# Patient Record
Sex: MALE | Race: WHITE | NOT HISPANIC OR LATINO | Employment: OTHER | URBAN - METROPOLITAN AREA
[De-identification: names, ages, dates, MRNs, and addresses within clinical notes are randomized per-mention and may not be internally consistent; named-entity substitution may affect disease eponyms.]

---

## 2017-05-16 NOTE — PATIENT DISCUSSION
Emphasized importance of antioxidant vitamins (AREDS II), use of AMSLER grid and UV protecting sunglasses.

## 2017-06-15 NOTE — PROCEDURE NOTE: CLINICAL
PROCEDURE NOTE: Eylea (2 of 2) OS. Diagnosis: Neovascular AMD with Active CNV. Prep: Betadine Flush. Prior to injection, risks/benefits/alternatives discussed including corneal abrasion, infection, loss of vision, hemorrhage, cataract, glaucoma, retinal tears or detachment. A written consent is on file, and the need for today’s injection was discussed and the patient is understanding and wishes to proceed. The entire vial of Eylea was drawn up into a syringe. The opened vial, remaining drug, and filtered needle were disposed of in a certified biohazard container. Betadine prep was performed. Topical anesthesia was induced with Alcaine. 4% lidocaine pledge. A lid speculum was used. A short 30g needle on a 1cc syringe was used with product that that had previously been prepared under sterile conditions. Injection site: 3-4 mm from the limbus. The used syringe/needle was transferred to a biohazard container. Lid speculum removed. Mask worn during procedure. Patient tolerated procedure well. Count fingers vision was verified. There were no complications. Patient was given the standard instruction sheet. Patient given office phone number/answering service number and advised to call immediately should there be loss of vision or pain, or should they have any other questions or concerns. Kary Chauhan

## 2017-07-25 NOTE — PATIENT DISCUSSION
Goal of tx edu, TX NOT a cure and likely will need periodic tx for years.  Rec Eylea injection x 2-3 weeks as a prophy.

## 2017-08-17 NOTE — PROCEDURE NOTE: CLINICAL
PROCEDURE NOTE: Eylea (Prophy) #3 OS. Diagnosis: Neovascular AMD with Inactive CNV. Prior to injection, risks/benefits/alternatives discussed including corneal abrasion, infection, loss of vision, hemorrhage, cataract, glaucoma, retinal tears or detachment. A written consent is on file, and the need for today’s injection was discussed and the patient is understanding and wishes to proceed. The entire vial of Eylea was drawn up into a syringe. The opened vial, remaining drug, and filtered needle were disposed of in a certified biohazard container. Betadine prep was performed. Topical anesthesia was induced with Alcaine. 4% lidocaine pledge. A lid speculum was used. A short 30g needle on a 1cc syringe was used with product that that had previously been prepared under sterile conditions. Injection site: 3-4 mm from the limbus. The used syringe/needle was transferred to a biohazard container. Lid speculum removed. Mask worn during procedure. Patient tolerated procedure well. Count fingers vision was verified. There were no complications. Patient was given the standard instruction sheet. Patient given office phone number/answering service number and advised to call immediately should there be loss of vision or pain, or should they have any other questions or concerns. Jaycee Apodaca

## 2017-10-12 NOTE — PATIENT DISCUSSION
Eylea #4 injection was administered without complication. Post-injection instructions were reviewed and understood by the patient.

## 2017-10-12 NOTE — PROCEDURE NOTE: CLINICAL
PROCEDURE NOTE: Eylea #4 OS. Diagnosis: Neovascular AMD with Active CNV. Prep: Betadine Drops and Betadine Scrub. Prior to injection, risks/benefits/alternatives discussed including corneal abrasion, infection, loss of vision, hemorrhage, cataract, glaucoma, retinal tears or detachment. A written consent is on file, and the need for today’s injection was discussed and the patient is understanding and wishes to proceed. The entire vial of Eylea was drawn up into a syringe. The opened vial, remaining drug, and filtered needle were disposed of in a certified biohazard container. Betadine prep was performed. Topical anesthesia was induced with Alcaine. 4% lidocaine pledge. A lid speculum was used. A short 30g needle on a 1cc syringe was used with product that that had previously been prepared under sterile conditions. Injection site: 3-4 mm from the limbus. The used syringe/needle was transferred to a biohazard container. Lid speculum removed. Mask worn during procedure. Patient tolerated procedure well. Count fingers vision was verified. There were no complications. Patient was given the standard instruction sheet. Patient given office phone number/answering service number and advised to call immediately should there be loss of vision or pain, or should they have any other questions or concerns. Montrell Hart

## 2017-12-07 NOTE — PROCEDURE NOTE: CLINICAL
PROCEDURE NOTE: Eylea #5 OS. Diagnosis: Neovascular AMD with Active CNV. Prep: Betadine Drops and Betadine Scrub. Prior to injection, risks/benefits/alternatives discussed including corneal abrasion, infection, loss of vision, hemorrhage, cataract, glaucoma, retinal tears or detachment. A written consent is on file, and the need for today’s injection was discussed and the patient is understanding and wishes to proceed. The entire vial of Eylea was drawn up into a syringe. The opened vial, remaining drug, and filtered needle were disposed of in a certified biohazard container. Betadine prep was performed. Topical anesthesia was induced with Alcaine. 4% lidocaine pledge. A lid speculum was used. A short 30g needle on a 1cc syringe was used with product that that had previously been prepared under sterile conditions. Injection site: 3-4 mm from the limbus. The used syringe/needle was transferred to a biohazard container. Lid speculum removed. Mask worn during procedure. Patient tolerated procedure well. Count fingers vision was verified. There were no complications. Patient was given the standard instruction sheet. Patient given office phone number/answering service number and advised to call immediately should there be loss of vision or pain, or should they have any other questions or concerns. Yosi Phillips

## 2018-01-25 NOTE — PROCEDURE NOTE: CLINICAL
PROCEDURE NOTE: Eylea #6 OS. Diagnosis: Neovascular AMD with Active CNV. Prep: Betadine Drops and Betadine Scrub. Prior to injection, risks/benefits/alternatives discussed including corneal abrasion, infection, loss of vision, hemorrhage, cataract, glaucoma, retinal tears or detachment. A written consent is on file, and the need for today’s injection was discussed and the patient is understanding and wishes to proceed. The entire vial of Eylea was drawn up into a syringe. The opened vial, remaining drug, and filtered needle were disposed of in a certified biohazard container. Betadine prep was performed. Topical anesthesia was induced with Alcaine. 4% lidocaine pledge. A lid speculum was used. A short 30g needle on a 1cc syringe was used with product that that had previously been prepared under sterile conditions. Injection site: 3-4 mm from the limbus. The used syringe/needle was transferred to a biohazard container. Lid speculum removed. Mask worn during procedure. Patient tolerated procedure well. Count fingers vision was verified. There were no complications. Patient was given the standard instruction sheet. Patient given office phone number/answering service number and advised to call immediately should there be loss of vision or pain, or should they have any other questions or concerns. Mariah Silva

## 2018-03-08 NOTE — PROCEDURE NOTE: CLINICAL
PROCEDURE NOTE: Eylea(2 OF 2) #7 OS. Diagnosis: Neovascular AMD with Active CNV. Prep: Betadine Drops and Betadine Scrub. Prior to injection, risks/benefits/alternatives discussed including corneal abrasion, infection, loss of vision, hemorrhage, cataract, glaucoma, retinal tears or detachment. A written consent is on file, and the need for today’s injection was discussed and the patient is understanding and wishes to proceed. The entire vial of Eylea was drawn up into a syringe. The opened vial, remaining drug, and filtered needle were disposed of in a certified biohazard container. Betadine prep was performed. Topical anesthesia was induced with Alcaine. 4% lidocaine pledge. A lid speculum was used. A short 30g needle on a 1cc syringe was used with product that that had previously been prepared under sterile conditions. Injection site: 3-4 mm from the limbus. The used syringe/needle was transferred to a biohazard container. Lid speculum removed. Mask worn during procedure. Patient tolerated procedure well. Count fingers vision was verified. There were no complications. Patient was given the standard instruction sheet. Patient given office phone number/answering service number and advised to call immediately should there be loss of vision or pain, or should they have any other questions or concerns. Tawnya Begum

## 2018-05-24 NOTE — PROCEDURE NOTE: CLINICAL
PROCEDURE NOTE: Eylea #8 OS. Diagnosis: Neovascular AMD with Active CNV. Prep: Betadine Drops and Betadine Scrub. Prior to injection, risks/benefits/alternatives discussed including corneal abrasion, infection, loss of vision, hemorrhage, cataract, glaucoma, retinal tears or detachment. A written consent is on file, and the need for today’s injection was discussed and the patient is understanding and wishes to proceed. The entire vial of Eylea was drawn up into a syringe. The opened vial, remaining drug, and filtered needle were disposed of in a certified biohazard container. Betadine prep was performed. Topical anesthesia was induced with Alcaine. 4% lidocaine pledge. A lid speculum was used. A short 30g needle on a 1cc syringe was used with product that that had previously been prepared under sterile conditions. Injection site: 3-4 mm from the limbus. The used syringe/needle was transferred to a biohazard container. Lid speculum removed. Mask worn during procedure. Patient tolerated procedure well. Count fingers vision was verified. There were no complications. Patient was given the standard instruction sheet. Patient given office phone number/answering service number and advised to call immediately should there be loss of vision or pain, or should they have any other questions or concerns. Bala Llanes

## 2018-07-26 NOTE — PROCEDURE NOTE: CLINICAL
PROCEDURE NOTE: Eylea #9 OS. Diagnosis: Neovascular AMD with Active CNV. Prep: Betadine Drops and Betadine Scrub. Prior to injection, risks/benefits/alternatives discussed including corneal abrasion, infection, loss of vision, hemorrhage, cataract, glaucoma, retinal tears or detachment. A written consent is on file, and the need for today’s injection was discussed and the patient is understanding and wishes to proceed. The entire vial of Eylea was drawn up into a syringe. The opened vial, remaining drug, and filtered needle were disposed of in a certified biohazard container. Betadine prep was performed. Topical anesthesia was induced with Alcaine. 4% lidocaine pledge. A lid speculum was used. A short 30g needle on a 1cc syringe was used with product that that had previously been prepared under sterile conditions. Injection site: 3-4 mm from the limbus. The used syringe/needle was transferred to a biohazard container. Lid speculum removed. Mask worn during procedure. Patient tolerated procedure well. Count fingers vision was verified. There were no complications. Patient was given the standard instruction sheet. Patient given office phone number/answering service number and advised to call immediately should there be loss of vision or pain, or should they have any other questions or concerns. Mariah Silva

## 2018-09-18 NOTE — PATIENT DISCUSSION
Eylea # 10 ( 1 of 2) injection recommended today after discussion of benefits, risks, alternatives. The injection was administered without complication. Post-injection instructions were reviewed and understood by the patient.

## 2018-09-18 NOTE — PATIENT DISCUSSION
Pt edu still inactive. No treatment recommended at this time. S/S of worsening discussed. Call with any vision changes.

## 2018-09-18 NOTE — PROCEDURE NOTE: CLINICAL
PROCEDURE NOTE: Eylea (1 of 2) #10 OS. Diagnosis: Neovascular AMD with Active CNV. Prior to injection, risks/benefits/alternatives discussed including corneal abrasion, infection, loss of vision, hemorrhage, cataract, glaucoma, retinal tears or detachment. A written consent is on file, and the need for today’s injection was discussed and the patient is understanding and wishes to proceed. The entire vial of Eylea was drawn up into a syringe. The opened vial, remaining drug, and filtered needle were disposed of in a certified biohazard container. Betadine prep was performed. Topical anesthesia was induced with Alcaine. 4% lidocaine pledge. A lid speculum was used. A short 30g needle on a 1cc syringe was used with product that that had previously been prepared under sterile conditions. Injection site: 3-4 mm from the limbus. The used syringe/needle was transferred to a biohazard container. Lid speculum removed. Mask worn during procedure. Patient tolerated procedure well. Count fingers vision was verified. There were no complications. Patient was given the standard instruction sheet. Patient given office phone number/answering service number and advised to call immediately should there be loss of vision or pain, or should they have any other questions or concerns. Montrell Hart

## 2018-09-18 NOTE — PATIENT DISCUSSION
Pt edu swelling worse and vision worse. Recommend a series of 2 Eylea, 5 weeks apart. Pt elects to proceed.

## 2018-10-25 NOTE — PROCEDURE NOTE: CLINICAL
PROCEDURE NOTE: Eylea 2mg ( 2 of 2) #11 OS. Diagnosis: Neovascular AMD with Active CNV. Prior to injection, risks/benefits/alternatives discussed including corneal abrasion, infection, loss of vision, hemorrhage, cataract, glaucoma, retinal tears or detachment. A written consent is on file, and the need for today’s injection was discussed and the patient is understanding and wishes to proceed. The entire vial of Eylea was drawn up into a syringe. The opened vial, remaining drug, and filtered needle were disposed of in a certified biohazard container. Betadine prep was performed. Topical anesthesia was induced with Alcaine. 4% lidocaine pledge. A lid speculum was used. A short 30g needle on a 1cc syringe was used with product that that had previously been prepared under sterile conditions. Injection site: 3-4 mm from the limbus. The used syringe/needle was transferred to a biohazard container. Lid speculum removed. Mask worn during procedure. Patient tolerated procedure well. Count fingers vision was verified. There were no complications. Patient was given the standard instruction sheet. Patient given office phone number/answering service number and advised to call immediately should there be loss of vision or pain, or should they have any other questions or concerns. Evon Ho

## 2018-10-25 NOTE — PATIENT DISCUSSION
Eylea # 11 ( 2 of 2) injection recommended today after discussion of benefits, risks, alternatives. The injection was administered without complication. Post-injection instructions were reviewed and understood by the patient.

## 2018-12-04 NOTE — PATIENT DISCUSSION
NO edema seen on OCT or exam today.  Recommended Eylea #12 today.  Series of 2 x 6 weeks apart.  The injection was administered without complication. Post-injection instructions were reviewed and understood by the patient.

## 2018-12-04 NOTE — PROCEDURE NOTE: CLINICAL
PROCEDURE NOTE: Eylea 2mg (1 of 2) #12 OS. Diagnosis: Neovascular AMD with Active CNV. Prior to injection, risks/benefits/alternatives discussed including corneal abrasion, infection, loss of vision, hemorrhage, cataract, glaucoma, retinal tears or detachment. A written consent is on file, and the need for today’s injection was discussed and the patient is understanding and wishes to proceed. The entire vial of Eylea was drawn up into a syringe. The opened vial, remaining drug, and filtered needle were disposed of in a certified biohazard container. Betadine prep was performed. Topical anesthesia was induced with Alcaine. 4% lidocaine pledge. A lid speculum was used. A short 30g needle on a 1cc syringe was used with product that that had previously been prepared under sterile conditions. Injection site: 3-4 mm from the limbus. The used syringe/needle was transferred to a biohazard container. Lid speculum removed. Mask worn during procedure. Patient tolerated procedure well. Count fingers vision was verified. There were no complications. Patient was given the standard instruction sheet. Patient given office phone number/answering service number and advised to call immediately should there be loss of vision or pain, or should they have any other questions or concerns. Kalyani Lauren

## 2019-02-25 ENCOUNTER — NEW PATIENT EMERGENCY (OUTPATIENT)
Dept: URBAN - METROPOLITAN AREA CLINIC 36 | Facility: CLINIC | Age: 64
End: 2019-02-25

## 2019-02-25 DIAGNOSIS — H57.89: ICD-10-CM

## 2019-02-25 PROCEDURE — 99202 OFFICE O/P NEW SF 15 MIN: CPT

## 2019-02-25 ASSESSMENT — VISUAL ACUITY
OS_SC: 20/30
OD_SC: 20/40

## 2019-02-25 ASSESSMENT — TONOMETRY
OS_IOP_MMHG: 14
OD_IOP_MMHG: 14

## 2019-03-05 NOTE — PATIENT DISCUSSION
Pt edu still inactive. No treatment recommended at this time due to no increase in [place] potential due to damage from advanced AMD. S/S of worsening discussed. Call with any vision changes.

## 2019-03-05 NOTE — PATIENT DISCUSSION
Pt edu stable. No fluid seen on exam or OCT. Due to high tendency to return would recommend a series of 2 Eylea, 8 weeks apart due to only good seeing eye. Pt elects to proceed.

## 2019-03-21 NOTE — PATIENT DISCUSSION
Eylea # 14 (1 of 2) injection recommended today after discussion of benefits, risks, alternatives, and off-label use. The injection was administered without complication. Post-injection instructions were reviewed and understood by the patient.

## 2019-03-21 NOTE — PROCEDURE NOTE: CLINICAL
PROCEDURE NOTE: Eylea 2mg ( 1 of 2) #14 OS. Diagnosis: Neovascular AMD with Active CNV. Prior to injection, risks/benefits/alternatives discussed including corneal abrasion, infection, loss of vision, hemorrhage, cataract, glaucoma, retinal tears or detachment. A written consent is on file, and the need for today’s injection was discussed and the patient is understanding and wishes to proceed. The entire vial of Eylea was drawn up into a syringe. The opened vial, remaining drug, and filtered needle were disposed of in a certified biohazard container. Betadine prep was performed. Topical anesthesia was induced with Alcaine. 4% lidocaine pledge. A lid speculum was used. A short 30g needle on a 1cc syringe was used with product that that had previously been prepared under sterile conditions. Injection site: 3-4 mm from the limbus. The used syringe/needle was transferred to a biohazard container. Lid speculum removed. Mask worn during procedure. Patient tolerated procedure well. Count fingers vision was verified. There were no complications. Patient was given the standard instruction sheet. Patient given office phone number/answering service number and advised to call immediately should there be loss of vision or pain, or should they have any other questions or concerns. Kary Chauhan

## 2019-05-09 NOTE — PATIENT DISCUSSION
Pt edu still inactive. No treatment recommended at this time due to no increase in [facility] potential due to damage from advanced AMD. S/S of worsening discussed. Call with any vision changes.

## 2019-05-09 NOTE — PATIENT DISCUSSION
Pt here for Eylea # 15 (2 of 2) injection recommended today after discussion of benefits, risks, alternatives, and off-label use. The injection was administered without complication. Post-injection instructions were reviewed and understood by the patient.

## 2019-05-09 NOTE — PROCEDURE NOTE: CLINICAL
PROCEDURE NOTE: Eylea 2mg ( 2 of 2) #15 OS. Diagnosis: Neovascular AMD with Active CNV. Prior to injection, risks/benefits/alternatives discussed including corneal abrasion, infection, loss of vision, hemorrhage, cataract, glaucoma, retinal tears or detachment. A written consent is on file, and the need for today’s injection was discussed and the patient is understanding and wishes to proceed. The entire vial of Eylea was drawn up into a syringe. The opened vial, remaining drug, and filtered needle were disposed of in a certified biohazard container. Betadine prep was performed. Topical anesthesia was induced with Alcaine. 4% lidocaine pledge. A lid speculum was used. A short 30g needle on a 1cc syringe was used with product that that had previously been prepared under sterile conditions. Injection site: 3-4 mm from the limbus. The used syringe/needle was transferred to a biohazard container. Lid speculum removed. Mask worn during procedure. Patient tolerated procedure well. Count fingers vision was verified. There were no complications. Patient was given the standard instruction sheet. Patient given office phone number/answering service number and advised to call immediately should there be loss of vision or pain, or should they have any other questions or concerns. [facility][id number] exp 11/30/2019.

## 2019-06-18 NOTE — PATIENT DISCUSSION
Pt edu improved since last series of Eylea, Due to high tendency to return and better seeing eye would recommend continuing with series of Eylea, 9 weeks apart. Goal is to treat and extend. Pt elects to proceed.

## 2019-07-11 NOTE — PROCEDURE NOTE: CLINICAL
PROCEDURE NOTE: Eylea 2mg (1 of 2) #16 OS. Diagnosis: Neovascular AMD with Active CNV. Prior to injection, risks/benefits/alternatives discussed including corneal abrasion, infection, loss of vision, hemorrhage, cataract, glaucoma, retinal tears or detachment. A written consent is on file, and the need for today’s injection was discussed and the patient is understanding and wishes to proceed. The entire vial of Eylea was drawn up into a syringe. The opened vial, remaining drug, and filtered needle were disposed of in a certified biohazard container. Betadine prep was performed. Topical anesthesia was induced with Alcaine. 4% lidocaine pledge. A lid speculum was used. A short 30g needle on a 1cc syringe was used with product that that had previously been prepared under sterile conditions. Injection site: 3-4 mm from the limbus. The used syringe/needle was transferred to a biohazard container. Lid speculum removed. Mask worn during procedure. Patient tolerated procedure well. Count fingers vision was verified. There were no complications. Patient was given the standard instruction sheet. Patient given office phone number/answering service number and advised to call immediately should there be loss of vision or pain, or should they have any other questions or concerns. Severino Haynes

## 2019-09-12 NOTE — PROCEDURE NOTE: CLINICAL
PROCEDURE NOTE: Eylea 2mg(2 of 2) #17 OS. Diagnosis: Neovascular AMD with Active CNV. Prior to injection, risks/benefits/alternatives discussed including corneal abrasion, infection, loss of vision, hemorrhage, cataract, glaucoma, retinal tears or detachment. A written consent is on file, and the need for today’s injection was discussed and the patient is understanding and wishes to proceed. The entire vial of Eylea was drawn up into a syringe. The opened vial, remaining drug, and filtered needle were disposed of in a certified biohazard container. Betadine prep was performed. Topical anesthesia was induced with Alcaine. 4% lidocaine pledge. A lid speculum was used. A short 30g needle on a 1cc syringe was used with product that that had previously been prepared under sterile conditions. Injection site: 3-4 mm from the limbus. The used syringe/needle was transferred to a biohazard container. Lid speculum removed. Mask worn during procedure. Patient tolerated procedure well. Count fingers vision was verified. There were no complications. Patient was given the standard instruction sheet. Patient given office phone number/answering service number and advised to call immediately should there be loss of vision or pain, or should they have any other questions or concerns. [facility]#[id number] exp 05/2020.

## 2019-11-05 NOTE — PATIENT DISCUSSION
Good response to tx, no active fluid or blood but high tendency for activity to return.  Goal of tx edu, tx not a cure.  Rec series of 2 Avastin x 10 weeks apart,Patient will return for procedure.  Tx Q 10 weeks as long as stays inactive.

## 2019-11-21 NOTE — PATIENT DISCUSSION
Pt here today for Eylea #18 (1 of 2) injection.  The injection was administered without complication.  Post-injection instructions were reviewed and understood by pt.

## 2019-11-21 NOTE — PROCEDURE NOTE: CLINICAL
PROCEDURE NOTE: Eylea 2mg (1 of 2) #18 OS. Diagnosis: Neovascular AMD with Active CNV. Prep: Betadine Drops and Betadine Scrub. Prior to injection, risks/benefits/alternatives discussed including corneal abrasion, infection, loss of vision, hemorrhage, cataract, glaucoma, retinal tears or detachment. A written consent is on file, and the need for today’s injection was discussed and the patient is understanding and wishes to proceed. The entire vial of Eylea was drawn up into a syringe. The opened vial, remaining drug, and filtered needle were disposed of in a certified biohazard container. Betadine prep was performed. Topical anesthesia was induced with Alcaine. 4% lidocaine pledge. A lid speculum was used. A short 30g needle on a 1cc syringe was used with product that that had previously been prepared under sterile conditions. Injection site: 3-4 mm from the limbus. The used syringe/needle was transferred to a biohazard container. Lid speculum removed. Mask worn during procedure. Patient tolerated procedure well. Count fingers vision was verified. There were no complications. Patient was given the standard instruction sheet. Patient given office phone number/answering service number and advised to call immediately should there be loss of vision or pain, or should they have any other questions or concerns. Lot #[id number], exp 9/30/20.

## 2020-01-30 NOTE — PROCEDURE NOTE: CLINICAL
PROCEDURE NOTE: Eylea 2mg (2 of 2) #19 OS. Diagnosis: Neovascular AMD with Active CNV. Prep: Betadine Drops and Betadine Scrub. Prior to injection, risks/benefits/alternatives discussed including corneal abrasion, infection, loss of vision, hemorrhage, cataract, glaucoma, retinal tears or detachment. A written consent is on file, and the need for today’s injection was discussed and the patient is understanding and wishes to proceed. The entire vial of Eylea was drawn up into a syringe. The opened vial, remaining drug, and filtered needle were disposed of in a certified biohazard container. Betadine prep was performed. Topical anesthesia was induced with Alcaine. 4% lidocaine pledge. A lid speculum was used. A short 30g needle on a 1cc syringe was used with product that that had previously been prepared under sterile conditions. Injection site: 3-4 mm from the limbus. The used syringe/needle was transferred to a biohazard container. Lid speculum removed. Mask worn during procedure. Patient tolerated procedure well. Count fingers vision was verified. There were no complications. Patient was given the standard instruction sheet. Patient given office phone number/answering service number and advised to call immediately should there be loss of vision or pain, or should they have any other questions or concerns. Kalpana Sanchez

## 2020-01-30 NOTE — PATIENT DISCUSSION
Pt here today for Eylea #19 (2 of 2) injection.  The injection was administered without complication.  Post-injection instructions were reviewed and understood by pt.

## 2020-03-18 ENCOUNTER — ESTABLISHED COMPREHENSIVE EXAM (OUTPATIENT)
Dept: URBAN - METROPOLITAN AREA CLINIC 36 | Facility: CLINIC | Age: 65
End: 2020-03-18

## 2020-03-18 DIAGNOSIS — H52.03: ICD-10-CM

## 2020-03-18 PROCEDURE — 92015 DETERMINE REFRACTIVE STATE: CPT

## 2020-03-18 PROCEDURE — 92014 COMPRE OPH EXAM EST PT 1/>: CPT

## 2020-03-18 ASSESSMENT — VISUAL ACUITY
OD_SC: 20/20-1
OD_CC: J2
OS_SC: 20/30-2

## 2020-03-18 ASSESSMENT — TONOMETRY
OS_IOP_MMHG: 16
OD_IOP_MMHG: 16

## 2020-03-24 NOTE — PROCEDURE NOTE: CLINICAL
PROCEDURE NOTE: Eylea Prefilled Syringe 2mg (1 of 2) #20 OS. Diagnosis: Neovascular AMD with Active CNV. Prep: Betadine Drops and Betadine Scrub. Prior to injection, risks/benefits/alternatives discussed including corneal abrasion, infection, loss of vision, hemorrhage, cataract, glaucoma, retinal tears or detachment. A written consent is on file, and the need for today's injection was discussed and the patient is understanding and wishes to proceed. A 30G needle was placed on an Eylea 2mg/0.05ml Pre-filled Syringe. Betadine prep was performed. Topical anesthesia was induced with Alcaine. 4% lidocaine pledge. A lid speculum was used. An intravitreal injection of Eylea was given. Injection site: 3-4 mm from the limbus. The used syringe/needle was transferred to a biohazard container. Lid speculum removed. Mask worn during procedure. Patient tolerated procedure well. Count fingers vision was verified. There were no complications. Patient was given the standard instruction sheet. Stephanie Witt

## 2020-03-24 NOTE — PATIENT DISCUSSION
COVID PROTOCOL. OCT remaining stable, due to better seeing eye would recommend continuing with a series of 2 Eylea, 2 months apart. Pt elects to proceed.

## 2020-03-24 NOTE — PATIENT DISCUSSION
Eylea #20 (1 of 2) injection recommended today after discussion of benefits, risks, alternatives, and off-label use. The injection was administered without complication. Post-injection instructions were reviewed and understood by the patient.

## 2020-05-04 ENCOUNTER — CATARACT EVALUATION (OUTPATIENT)
Dept: URBAN - METROPOLITAN AREA CLINIC 43 | Facility: CLINIC | Age: 65
End: 2020-05-04

## 2020-05-04 DIAGNOSIS — H25.812: ICD-10-CM

## 2020-05-04 DIAGNOSIS — H17.9: ICD-10-CM

## 2020-05-04 DIAGNOSIS — H52.212: ICD-10-CM

## 2020-05-04 DIAGNOSIS — H25.811: ICD-10-CM

## 2020-05-04 PROCEDURE — 99214 OFFICE O/P EST MOD 30 MIN: CPT

## 2020-05-04 PROCEDURE — 92136TC50 INTERFEROMETRY - TECHNICAL COMPONENT

## 2020-05-04 PROCEDURE — 92025-2 CORNEAL TOPOGRAPHY, PT

## 2020-05-04 PROCEDURE — V2799PMN IMPRIMIS PRED-MOXI-NEPAF 5ML

## 2020-05-04 PROCEDURE — 92015 DETERMINE REFRACTIVE STATE: CPT

## 2020-05-04 ASSESSMENT — VISUAL ACUITY
OU_SC: 20/20-2
OD_BAT: 20/400
OS_SC: J10
OD_CC: J2+
OS_BAT: 20/80
OD_SC: 20/20-2
OU_CC: J1-
OS_SC: 20/40-2
OU_SC: J10
OD_SC: J10

## 2020-05-04 ASSESSMENT — TONOMETRY
OD_IOP_MMHG: 14
OS_IOP_MMHG: 14

## 2020-05-21 NOTE — PATIENT DISCUSSION
Eylea #21 (2 of 2) injection recommended today after discussion of benefits, risks, alternatives. The injection was administered without complication. Post-injection instructions were reviewed and understood by the patient.

## 2020-05-21 NOTE — PROCEDURE NOTE: CLINICAL
PROCEDURE NOTE: Eylea Prefilled Syringe 2mg (2 of 2) #21 OS. Diagnosis: Neovascular AMD with Active CNV. Prep: Betadine Drops and Betadine Scrub. Prior to injection, risks/benefits/alternatives discussed including corneal abrasion, infection, loss of vision, hemorrhage, cataract, glaucoma, retinal tears or detachment. A written consent is on file, and the need for today's injection was discussed and the patient is understanding and wishes to proceed. A 30G needle was placed on an Eylea 2mg/0.05ml Pre-filled Syringe. Betadine prep was performed. Topical anesthesia was induced with Alcaine. 4% lidocaine pledge. A lid speculum was used. An intravitreal injection of Eylea was given. Injection site: 3-4 mm from the limbus. The used syringe/needle was transferred to a biohazard container. Lid speculum removed. Mask worn during procedure. Patient tolerated procedure well. Count fingers vision was verified. There were no complications. Patient was given the standard instruction sheet. Yosi Phillips

## 2020-07-16 NOTE — PATIENT DISCUSSION
Based on the OCT findings, I recommend a “treat and extend” protocol, and we will extend the injection interval to 8 weeks.

## 2020-07-16 NOTE — PATIENT DISCUSSION
Eylea #22 (1 of 2) injection recommended today after discussion of benefits, risks, alternatives. The injection was administered without complication. Post-injection instructions were reviewed and understood by the patient.

## 2020-07-16 NOTE — PROCEDURE NOTE: CLINICAL
PROCEDURE NOTE: Eylea Prefilled Syringe 2mg (1 of 2) #22 OS. Diagnosis: Neovascular AMD with Active CNV. Prior to injection, risks/benefits/alternatives discussed including corneal abrasion, infection, loss of vision, hemorrhage, cataract, glaucoma, retinal tears or detachment. A written consent is on file, and the need for today's injection was discussed and the patient is understanding and wishes to proceed. A 30G needle was placed on an Eylea 2mg/0.05ml Pre-filled Syringe. Betadine prep was performed. Topical anesthesia was induced with Alcaine. 4% lidocaine pledge. A lid speculum was used. An intravitreal injection of Eylea was given. Injection site: 3-4 mm from the limbus. The used syringe/needle was transferred to a biohazard container. Lid speculum removed. Mask worn during procedure. Patient tolerated procedure well. Count fingers vision was verified. There were no complications. Patient was given the standard instruction sheet. Reed Lombardi

## 2020-08-13 ENCOUNTER — ESTABLISHED PATIENT (OUTPATIENT)
Dept: URBAN - METROPOLITAN AREA SURGERY 14 | Facility: SURGERY | Age: 65
End: 2020-08-13

## 2020-08-13 ENCOUNTER — SURGERY/PROCEDURE (OUTPATIENT)
Dept: URBAN - METROPOLITAN AREA CLINIC 43 | Facility: CLINIC | Age: 65
End: 2020-08-13

## 2020-08-13 DIAGNOSIS — H52.03: ICD-10-CM

## 2020-08-13 DIAGNOSIS — H25.811: ICD-10-CM

## 2020-08-13 DIAGNOSIS — H25.812: ICD-10-CM

## 2020-08-13 DIAGNOSIS — H53.2: ICD-10-CM

## 2020-08-13 DIAGNOSIS — Z98.890: ICD-10-CM

## 2020-08-13 DIAGNOSIS — H17.9: ICD-10-CM

## 2020-08-13 DIAGNOSIS — H52.212: ICD-10-CM

## 2020-08-13 PROCEDURE — 66984AV REMOVE CATARACT, INSERT ADVANCED LENS

## 2020-08-13 PROCEDURE — 66999LNSR LENSAR LASER FOR CAT SX

## 2020-08-13 PROCEDURE — 99211HP H&P OFFICE/OUTPATIENT VISIT, EST

## 2020-08-14 ENCOUNTER — 1 DAY POST-OP (OUTPATIENT)
Dept: URBAN - METROPOLITAN AREA CLINIC 36 | Facility: CLINIC | Age: 65
End: 2020-08-14

## 2020-08-14 DIAGNOSIS — Z96.1: ICD-10-CM

## 2020-08-14 PROCEDURE — 99024 POSTOP FOLLOW-UP VISIT: CPT

## 2020-08-14 ASSESSMENT — VISUAL ACUITY
OD_SC: 20/25
OS_PH: 20/40-2
OS_SC: 20/50-2
OS_SC: J4
OD_SC: >J10

## 2020-08-14 ASSESSMENT — TONOMETRY
OD_IOP_MMHG: 15
OS_IOP_MMHG: 16

## 2020-08-19 ENCOUNTER — POST-OP (OUTPATIENT)
Dept: URBAN - METROPOLITAN AREA CLINIC 36 | Facility: CLINIC | Age: 65
End: 2020-08-19

## 2020-08-19 DIAGNOSIS — H52.03: ICD-10-CM

## 2020-08-19 DIAGNOSIS — Z96.1: ICD-10-CM

## 2020-08-19 PROCEDURE — 99024 POSTOP FOLLOW-UP VISIT: CPT

## 2020-08-19 ASSESSMENT — VISUAL ACUITY
OD_SC: 20/25
OS_SC: J3
OS_PH: 20/25-1
OS_SC: 20/30-1
OD_SC: >J10

## 2020-08-19 ASSESSMENT — TONOMETRY
OS_IOP_MMHG: 14
OD_IOP_MMHG: 15

## 2020-09-10 ENCOUNTER — POST-OP (OUTPATIENT)
Dept: URBAN - METROPOLITAN AREA CLINIC 36 | Facility: CLINIC | Age: 65
End: 2020-09-10

## 2020-09-10 DIAGNOSIS — Z96.1: ICD-10-CM

## 2020-09-10 DIAGNOSIS — H17.9: ICD-10-CM

## 2020-09-10 DIAGNOSIS — H25.811: ICD-10-CM

## 2020-09-10 PROCEDURE — 99024 POSTOP FOLLOW-UP VISIT: CPT

## 2020-09-10 ASSESSMENT — TONOMETRY
OS_IOP_MMHG: 15
OD_IOP_MMHG: 15

## 2020-09-10 ASSESSMENT — VISUAL ACUITY
OD_BAT: 20/70
OD_SC: J12
OS_BAT: 20/70
OS_SC: 20/20
OD_SC: 20/20-1
OS_SC: J4

## 2020-09-10 NOTE — PATIENT DISCUSSION
Eylea #23 (2 of 2) injection recommended today after discussion of benefits, risks, alternatives. The injection was administered without complication. Post-injection instructions were reviewed and understood by the patient.

## 2020-09-10 NOTE — PROCEDURE NOTE: CLINICAL
PROCEDURE NOTE: Eylea Prefilled Syringe 2mg (2 of 2) #23 OS. Diagnosis: Neovascular AMD with Active CNV. Prep: Betadine Drops and Betadine Scrub. Prior to injection, risks/benefits/alternatives discussed including corneal abrasion, infection, loss of vision, hemorrhage, cataract, glaucoma, retinal tears or detachment. A written consent is on file, and the need for today's injection was discussed and the patient is understanding and wishes to proceed. A 30G needle was placed on an Eylea 2mg/0.05ml Pre-filled Syringe. Betadine prep was performed. Topical anesthesia was induced with Alcaine. 4% lidocaine pledge. A lid speculum was used. An intravitreal injection of Eylea was given. Injection site: 3-4 mm from the limbus. The used syringe/needle was transferred to a biohazard container. Lid speculum removed. Mask worn during procedure. Patient tolerated procedure well. Count fingers vision was verified. There were no complications. Patient was given the standard instruction sheet. Ximena Bates

## 2020-09-10 NOTE — PATIENT DISCUSSION
Pt edu still inactive. No treatment recommended at this time due to no increase in [address] potential due to damage from advanced AMD. S/S of worsening discussed. Call with any vision changes.

## 2020-10-22 ENCOUNTER — POST-OP (OUTPATIENT)
Dept: URBAN - METROPOLITAN AREA CLINIC 36 | Facility: CLINIC | Age: 65
End: 2020-10-22

## 2020-10-22 DIAGNOSIS — Z96.1: ICD-10-CM

## 2020-10-22 DIAGNOSIS — H17.9: ICD-10-CM

## 2020-10-22 DIAGNOSIS — H26.492: ICD-10-CM

## 2020-10-22 PROCEDURE — 99024 POSTOP FOLLOW-UP VISIT: CPT

## 2020-10-22 ASSESSMENT — VISUAL ACUITY
OS_BAT: 20/200
OD_SC: 20/25
OD_SC: J12
OS_SC: J3
OS_SC: 20/25

## 2020-10-22 ASSESSMENT — TONOMETRY
OD_IOP_MMHG: 15
OS_IOP_MMHG: 15

## 2020-11-10 NOTE — PATIENT DISCUSSION
Eylea #24 (1 of 2) injection recommended today after discussion of benefits, risks, alternatives. The injection was administered without complication. Post-injection instructions were reviewed and understood by the patient.

## 2020-11-10 NOTE — PATIENT DISCUSSION
Decision to treat was made based on today's clinical findings, pt edu stable. No fluid seen on exam or OCT. Due to better seeing eye would recommend a series of 2 Eylea, 8 weeks apart. Pt elects to proceed.

## 2020-11-10 NOTE — PROCEDURE NOTE: CLINICAL
PROCEDURE NOTE: Eylea 2mg (1 of 2) #24 OS. Diagnosis: Neovascular AMD with Active CNV. Prep: Betadine Drops and Betadine Scrub. Prior to injection, risks/benefits/alternatives discussed including corneal abrasion, infection, loss of vision, hemorrhage, cataract, glaucoma, retinal tears or detachment. A written consent is on file, and the need for today’s injection was discussed and the patient is understanding and wishes to proceed. The entire vial of Eylea was drawn up into a syringe. The opened vial, remaining drug, and filtered needle were disposed of in a certified biohazard container. Betadine prep was performed. Topical anesthesia was induced with Alcaine. 4% lidocaine pledge. A lid speculum was used. A short 30g needle on a 1cc syringe was used with product that that had previously been prepared under sterile conditions. Injection site: 3-4 mm from the limbus. The used syringe/needle was transferred to a biohazard container. Lid speculum removed. Mask worn during procedure. Patient tolerated procedure well. Count fingers vision was verified. There were no complications. Patient was given the standard instruction sheet. Patient given office phone number/answering service number and advised to call immediately should there be loss of vision or pain, or should they have any other questions or concerns. Cm Garcia

## 2020-11-10 NOTE — PATIENT DISCUSSION
Recommend starting Zerviate 1gtt BID OU, samples given. Use until gone. Also recommend w/c at night for OU.

## 2020-11-19 ENCOUNTER — POST-OP (OUTPATIENT)
Dept: URBAN - METROPOLITAN AREA CLINIC 36 | Facility: CLINIC | Age: 65
End: 2020-11-19

## 2020-11-19 DIAGNOSIS — Z96.1: ICD-10-CM

## 2020-11-19 DIAGNOSIS — H26.492: ICD-10-CM

## 2020-11-19 PROCEDURE — 99024 POSTOP FOLLOW-UP VISIT: CPT

## 2020-11-19 PROCEDURE — 92134 CPTRZ OPH DX IMG PST SGM RTA: CPT

## 2020-11-19 ASSESSMENT — VISUAL ACUITY
OS_BAT: 20/40
OD_SC: 20/20-2
OD_SC: J8
OS_SC: 20/30-2
OS_SC: J2

## 2020-11-19 ASSESSMENT — TONOMETRY
OS_IOP_MMHG: 10
OD_IOP_MMHG: 16

## 2020-12-15 ENCOUNTER — SURGERY/PROCEDURE (OUTPATIENT)
Dept: URBAN - METROPOLITAN AREA SURGERY 14 | Facility: SURGERY | Age: 65
End: 2020-12-15

## 2020-12-15 ENCOUNTER — YAG EVALUATION (OUTPATIENT)
Dept: URBAN - METROPOLITAN AREA CLINIC 39 | Facility: CLINIC | Age: 65
End: 2020-12-15

## 2020-12-15 DIAGNOSIS — H26.492: ICD-10-CM

## 2020-12-15 DIAGNOSIS — Z96.1: ICD-10-CM

## 2020-12-15 PROCEDURE — 92014 COMPRE OPH EXAM EST PT 1/>: CPT

## 2020-12-15 PROCEDURE — 66821 AFTER CATARACT LASER SURGERY: CPT

## 2020-12-15 ASSESSMENT — VISUAL ACUITY
OD_SC: 20/40
OS_BAT: 20/40
OS_SC: 20/40

## 2020-12-15 ASSESSMENT — TONOMETRY: OS_IOP_MMHG: 16

## 2020-12-29 ENCOUNTER — YAG POST-OP (OUTPATIENT)
Dept: URBAN - METROPOLITAN AREA CLINIC 36 | Facility: CLINIC | Age: 65
End: 2020-12-29

## 2020-12-29 DIAGNOSIS — H17.9: ICD-10-CM

## 2020-12-29 DIAGNOSIS — Z96.1: ICD-10-CM

## 2020-12-29 DIAGNOSIS — H52.03: ICD-10-CM

## 2020-12-29 DIAGNOSIS — Z98.890: ICD-10-CM

## 2020-12-29 PROCEDURE — 99024 POSTOP FOLLOW-UP VISIT: CPT

## 2020-12-29 ASSESSMENT — TONOMETRY
OS_IOP_MMHG: 10
OD_IOP_MMHG: 10

## 2020-12-29 ASSESSMENT — VISUAL ACUITY
OS_PH: 20/20-1
OD_SC: J12
OS_SC: J6
OS_SC: 20/30
OD_SC: 20/20-1

## 2021-01-07 NOTE — PATIENT DISCUSSION
Eylea #25 (2 of 2) injection recommended today after discussion of benefits, risks, alternatives. The injection was administered without complication. Post-injection instructions were reviewed and understood by the patient.

## 2021-01-07 NOTE — PROCEDURE NOTE: CLINICAL
PROCEDURE NOTE: Eylea 2mg (2 of 2) #25 OS. Diagnosis: Neovascular AMD with Active CNV. Prep: Betadine Drops and Betadine Scrub. Prior to injection, risks/benefits/alternatives discussed including corneal abrasion, infection, loss of vision, hemorrhage, cataract, glaucoma, retinal tears or detachment. A written consent is on file, and the need for today’s injection was discussed and the patient is understanding and wishes to proceed. The entire vial of Eylea was drawn up into a syringe. The opened vial, remaining drug, and filtered needle were disposed of in a certified biohazard container. Betadine prep was performed. Topical anesthesia was induced with Alcaine. 4% lidocaine pledge. A lid speculum was used. A short 30g needle on a 1cc syringe was used with product that that had previously been prepared under sterile conditions. Injection site: 3-4 mm from the limbus. The used syringe/needle was transferred to a biohazard container. Lid speculum removed. Mask worn during procedure. Patient tolerated procedure well. Count fingers vision was verified. There were no complications. Patient was given the standard instruction sheet. Patient given office phone number/answering service number and advised to call immediately should there be loss of vision or pain, or should they have any other questions or concerns. Natalie Garrison

## 2021-02-16 ENCOUNTER — YAG POST-OP (OUTPATIENT)
Dept: URBAN - METROPOLITAN AREA CLINIC 36 | Facility: CLINIC | Age: 66
End: 2021-02-16

## 2021-02-16 DIAGNOSIS — Z96.1: ICD-10-CM

## 2021-02-16 DIAGNOSIS — Z98.890: ICD-10-CM

## 2021-02-16 DIAGNOSIS — H17.9: ICD-10-CM

## 2021-02-16 DIAGNOSIS — H25.811: ICD-10-CM

## 2021-02-16 PROCEDURE — 99024 POSTOP FOLLOW-UP VISIT: CPT

## 2021-02-16 ASSESSMENT — VISUAL ACUITY
OS_SC: J2
OD_SC: J12
OS_PH: 20/20
OD_SC: 20/20-1
OS_SC: 20/30

## 2021-02-16 ASSESSMENT — TONOMETRY
OS_IOP_MMHG: 10
OD_IOP_MMHG: 10

## 2021-03-11 NOTE — PATIENT DISCUSSION
Eylea #26 (1 of 2) injection recommended today after discussion of benefits, risks, alternatives. The injection was administered without complication. Post-injection instructions were reviewed and understood by the patient.

## 2021-03-11 NOTE — PATIENT DISCUSSION
Decision to treat was made based on today's clinical findings. Recommend a series of 2 Eylea, 2 months apart. Pt elects to proceed.

## 2021-03-11 NOTE — PROCEDURE NOTE: CLINICAL
1. Caller Name: Nat Bryant                        Call Back Number: [phone] (home)         How would the patient prefer to be contacted with a response: Phone call do NOT leave a detailed message    Nat JESSICA advising Pt was sent home from  with 101.3 fever. Nat advisied pt is teething but also has a flemy cough that seems to be painful and has had pneumonia in the past without fevers and wants to know if she needs to be seen. -Per Starr Head Pt should go to [facility] as there are no more sick appt available today. Nat expressed understanding and states she would take the Pt into [facility] to be seen today.   PROCEDURE NOTE: Eylea 2mg (1 of 2) #26 OS. Diagnosis: Neovascular AMD with Active CNV. Prep: Betadine Drops and Betadine Scrub. Prior to injection, risks/benefits/alternatives discussed including corneal abrasion, infection, loss of vision, hemorrhage, cataract, glaucoma, retinal tears or detachment. A written consent is on file, and the need for today’s injection was discussed and the patient is understanding and wishes to proceed. The entire vial of Eylea was drawn up into a syringe. The opened vial, remaining drug, and filtered needle were disposed of in a certified biohazard container. Betadine prep was performed. Topical anesthesia was induced with Alcaine. 4% lidocaine pledge. A lid speculum was used. A short 30g needle on a 1cc syringe was used with product that that had previously been prepared under sterile conditions. Injection site: 3-4 mm from the limbus. The used syringe/needle was transferred to a biohazard container. Lid speculum removed. Mask worn during procedure. Patient tolerated procedure well. Count fingers vision was verified. There were no complications. Patient was given the standard instruction sheet. Patient given office phone number/answering service number and advised to call immediately should there be loss of vision or pain, or should they have any other questions or concerns. Yana Avila

## 2021-05-13 NOTE — PATIENT DISCUSSION
Eylea #27 (2 of 2) injection recommended today after discussion of benefits, risks, alternatives. The injection was administered without complication. Post-injection instructions were reviewed and understood by the patient.

## 2021-05-13 NOTE — PROCEDURE NOTE: CLINICAL
PROCEDURE NOTE: Eylea 2mg(2 of 2) #27 OS. Diagnosis: Neovascular AMD with Active CNV. Prep: Betadine Drops and Betadine Scrub. Prior to injection, risks/benefits/alternatives discussed including corneal abrasion, infection, loss of vision, hemorrhage, cataract, glaucoma, retinal tears or detachment. A written consent is on file, and the need for today’s injection was discussed and the patient is understanding and wishes to proceed. The entire vial of Eylea was drawn up into a syringe. The opened vial, remaining drug, and filtered needle were disposed of in a certified biohazard container. Betadine prep was performed. Topical anesthesia was induced with Alcaine. 4% lidocaine pledge. A lid speculum was used. A short 30g needle on a 1cc syringe was used with product that that had previously been prepared under sterile conditions. Injection site: 3-4 mm from the limbus. The used syringe/needle was transferred to a biohazard container. Lid speculum removed. Mask worn during procedure. Patient tolerated procedure well. Count fingers vision was verified. There were no complications. Patient was given the standard instruction sheet. Patient given office phone number/answering service number and advised to call immediately should there be loss of vision or pain, or should they have any other questions or concerns. Yana Avila

## 2021-07-08 NOTE — PROCEDURE NOTE: CLINICAL
PROCEDURE NOTE: Eylea 2mg(1 of 2) #28 OS. Diagnosis: Neovascular AMD with Active CNV. Prep: Betadine Drops and Betadine Scrub. Prior to injection, risks/benefits/alternatives discussed including corneal abrasion, infection, loss of vision, hemorrhage, cataract, glaucoma, retinal tears or detachment. A written consent is on file, and the need for today’s injection was discussed and the patient is understanding and wishes to proceed. The entire vial of Eylea was drawn up into a syringe. The opened vial, remaining drug, and filtered needle were disposed of in a certified biohazard container. Betadine prep was performed. Topical anesthesia was induced with Alcaine. 4% lidocaine pledge. A lid speculum was used. A short 30g needle on a 1cc syringe was used with product that that had previously been prepared under sterile conditions. Injection site: 3-4 mm from the limbus. The used syringe/needle was transferred to a biohazard container. Lid speculum removed. Mask worn during procedure. Patient tolerated procedure well. Count fingers vision was verified. There were no complications. Patient was given the standard instruction sheet. Patient given office phone number/answering service number and advised to call immediately should there be loss of vision or pain, or should they have any other questions or concerns. Neil Barry

## 2021-07-08 NOTE — PATIENT DISCUSSION
Good response to tx, no active fluid or blood today.  Eylea #28 (1 of 2) injection recommended today after discussion of benefits, risks, alternatives. The injection was administered without complication. Post-injection instructions were reviewed and understood by the patient.  seriers of 2 x 8 weeks apart. Iesha Anguiano

## 2021-09-09 NOTE — PROCEDURE NOTE: CLINICAL
PROCEDURE NOTE: Eylea 2mg (2 of 2) #29 OS. Diagnosis: Neovascular AMD with Active CNV. Prep: Betadine Drops and Betadine Scrub. Prior to injection, risks/benefits/alternatives discussed including corneal abrasion, infection, loss of vision, hemorrhage, cataract, glaucoma, retinal tears or detachment. A written consent is on file, and the need for today’s injection was discussed and the patient is understanding and wishes to proceed. The entire vial of Eylea was drawn up into a syringe. The opened vial, remaining drug, and filtered needle were disposed of in a certified biohazard container. Betadine prep was performed. Topical anesthesia was induced with Alcaine. 4% lidocaine pledge. A lid speculum was used. A short 30g needle on a 1cc syringe was used with product that that had previously been prepared under sterile conditions. Injection site: 3-4 mm from the limbus. The used syringe/needle was transferred to a biohazard container. Lid speculum removed. Mask worn during procedure. Patient tolerated procedure well. Count fingers vision was verified. There were no complications. Patient was given the standard instruction sheet. Patient given office phone number/answering service number and advised to call immediately should there be loss of vision or pain, or should they have any other questions or concerns. Kary Chauhan

## 2021-09-23 ENCOUNTER — RX CHANGE - NO APPT (OUTPATIENT)
Dept: URBAN - METROPOLITAN AREA CLINIC 36 | Facility: CLINIC | Age: 66
End: 2021-09-23

## 2021-10-19 NOTE — PROCEDURE NOTE: CLINICAL
PROCEDURE NOTE: Eylea Prefilled Syringe 2mg(1 of 2) #30 OS. Diagnosis: Neovascular AMD with Active CNV. Prep: Betadine Drops and Betadine Scrub. Prior to injection, risks/benefits/alternatives discussed including corneal abrasion, infection, loss of vision, hemorrhage, cataract, glaucoma, retinal tears or detachment. A written consent is on file, and the need for today's injection was discussed and the patient is understanding and wishes to proceed. A 30G needle was placed on an Eylea 2mg/0.05ml Pre-filled Syringe. Betadine prep was performed. Topical anesthesia was induced with Alcaine. 4% lidocaine pledge. A lid speculum was used. An intravitreal injection of Eylea was given. Injection site: 3-4 mm from the limbus. The used syringe/needle was transferred to a biohazard container. Lid speculum removed. Mask worn during procedure. Patient tolerated procedure well. Count fingers vision was verified. There were no complications. Patient was given the standard instruction sheet. Chaparro Stovall

## 2021-10-19 NOTE — PATIENT DISCUSSION
rec Eylea #30 (1 of 2) injection recommended today after discussion of benefits, risks, alternatives. The injection was administered without complication. Post-injection instructions were reviewed and understood by the patient.  series of 2 x 6 weeks apart.

## 2021-12-09 NOTE — PATIENT DISCUSSION
Rec Eylea #31 (2 of 2) injection recommended today after discussion of benefits, risks, alternatives. The injection was administered without complication. Post-injection instructions were reviewed and understood by the patient.  series of 2 x 6 weeks apart.

## 2021-12-09 NOTE — PROCEDURE NOTE: CLINICAL
PROCEDURE NOTE: Eylea 2mg(2 of 2) #31 OS. Diagnosis: Neovascular AMD with Active CNV. Prep: Betadine Drops and Betadine Scrub. Prior to injection, risks/benefits/alternatives discussed including corneal abrasion, infection, loss of vision, hemorrhage, cataract, glaucoma, retinal tears or detachment. A written consent is on file, and the need for today’s injection was discussed and the patient is understanding and wishes to proceed. The entire vial of Eylea was drawn up into a syringe. The opened vial, remaining drug, and filtered needle were disposed of in a certified biohazard container. Betadine prep was performed. Topical anesthesia was induced with Alcaine. 4% lidocaine pledge. A lid speculum was used. A short 30g needle on a 1cc syringe was used with product that that had previously been prepared under sterile conditions. Injection site: 3-4 mm from the limbus. The used syringe/needle was transferred to a biohazard container. Lid speculum removed. Mask worn during procedure. Patient tolerated procedure well. Count fingers vision was verified. There were no complications. Patient was given the standard instruction sheet. Patient given office phone number/answering service number and advised to call immediately should there be loss of vision or pain, or should they have any other questions or concerns. Enmanuel Stahl

## 2022-01-25 NOTE — PATIENT DISCUSSION
Rec Eylea #32 (21 of 2) injection recommended today after discussion of benefits, risks, alternatives. The injection was administered without complication. Post-injection instructions were reviewed and understood by the patient.  series of 2 x 6-7  weeks apart.

## 2022-01-25 NOTE — PROCEDURE NOTE: CLINICAL
PROCEDURE NOTE: Eylea 2mg(1 of 2) #32 OS. Diagnosis: Neovascular AMD with Active CNV. Prep: Betadine Drops and Betadine Scrub. Prior to injection, risks/benefits/alternatives discussed including corneal abrasion, infection, loss of vision, hemorrhage, cataract, glaucoma, retinal tears or detachment. A written consent is on file, and the need for today’s injection was discussed and the patient is understanding and wishes to proceed. The entire vial of Eylea was drawn up into a syringe. The opened vial, remaining drug, and filtered needle were disposed of in a certified biohazard container. Betadine prep was performed. Topical anesthesia was induced with Alcaine. 4% lidocaine pledge. A lid speculum was used. A short 30g needle on a 1cc syringe was used with product that that had previously been prepared under sterile conditions. Injection site: 3-4 mm from the limbus. The used syringe/needle was transferred to a biohazard container. Lid speculum removed. Mask worn during procedure. Patient tolerated procedure well. Count fingers vision was verified. There were no complications. Patient was given the standard instruction sheet. Patient given office phone number/answering service number and advised to call immediately should there be loss of vision or pain, or should they have any other questions or concerns. Evon Ho

## 2022-02-04 NOTE — PATIENT DISCUSSION
Patient is aware that his actual rx (the manifest) is slightly higher than he uses but he likes his glasses as they are and as such we continue to maintain his final rx as his habitual rx and not the manifest rx.

## 2022-02-28 ENCOUNTER — EMERGENCY VISIT (OUTPATIENT)
Dept: URBAN - METROPOLITAN AREA CLINIC 36 | Facility: CLINIC | Age: 67
End: 2022-02-28

## 2022-02-28 DIAGNOSIS — H43.392: ICD-10-CM

## 2022-02-28 PROCEDURE — 92012 INTRM OPH EXAM EST PATIENT: CPT

## 2022-02-28 PROCEDURE — 92134 CPTRZ OPH DX IMG PST SGM RTA: CPT

## 2022-02-28 ASSESSMENT — VISUAL ACUITY
OS_PH: 20/25-1
OD_SC: 20/20-2
OS_SC: 20/30
OS_SC: J4
OD_SC: J12

## 2022-02-28 ASSESSMENT — TONOMETRY
OD_IOP_MMHG: 10
OS_IOP_MMHG: 10

## 2022-03-17 NOTE — PATIENT DISCUSSION
Rec Eylea #33 (2 of 2) injection recommended today after discussion of benefits, risks, alternatives. The injection was administered without complication. Post-injection instructions were reviewed and understood by the patient.

## 2022-03-17 NOTE — PROCEDURE NOTE: CLINICAL
PROCEDURE NOTE: Eylea 2mg (2 of 2) #33 OS. Diagnosis: Neovascular AMD with Active CNV. Prep: Betadine Drops and Betadine Scrub. Prior to injection, risks/benefits/alternatives discussed including corneal abrasion, infection, loss of vision, hemorrhage, cataract, glaucoma, retinal tears or detachment. A written consent is on file, and the need for today’s injection was discussed and the patient is understanding and wishes to proceed. The entire vial of Eylea was drawn up into a syringe. The opened vial, remaining drug, and filtered needle were disposed of in a certified biohazard container. Betadine prep was performed. Topical anesthesia was induced with Alcaine. 4% lidocaine pledge. A lid speculum was used. A short 30g needle on a 1cc syringe was used with product that that had previously been prepared under sterile conditions. Injection site: 3-4 mm from the limbus. The used syringe/needle was transferred to a biohazard container. Lid speculum removed. Mask worn during procedure. Patient tolerated procedure well. Count fingers vision was verified. There were no complications. Patient was given the standard instruction sheet. Patient given office phone number/answering service number and advised to call immediately should there be loss of vision or pain, or should they have any other questions or concerns. Chaparro Stovall

## 2022-05-03 NOTE — PATIENT DISCUSSION
Rec Eylea #34 (1 of 2) injection recommended today after discussion of benefits, risks, alternatives. The injection was administered without complication. Post-injection instructions were reviewed and understood by the patient.  series of 2 x 6-7 weeks apart.

## 2022-05-03 NOTE — PATIENT DISCUSSION
Use artificial tears to affected eye(s) as needed.  Rec increase artificial tear use if notices increasing symptoms.

## 2022-05-03 NOTE — PATIENT DISCUSSION
Based on the OCT findings, I recommend a “treat and extend” protocol, and we will extend the injection interval to 6-7 weeks.

## 2022-05-03 NOTE — PATIENT DISCUSSION
There is no recurrence of intraretinal or subretinal fluid on spectral domain OCT today.  + active CNV, high risk of worsening without tx.  Rec cont w/ Eylea Q 6 weeks with exam and testing after series to eval progress of tx and tx interval.

## 2022-05-03 NOTE — PROCEDURE NOTE: CLINICAL
PROCEDURE NOTE: Eylea Prefilled Syringe 2mg (1 of 2) #34 OS. Diagnosis: Neovascular AMD with Active CNV. Prep: Betadine Drops and Betadine Scrub. Prior to injection, risks/benefits/alternatives discussed including corneal abrasion, infection, loss of vision, hemorrhage, cataract, glaucoma, retinal tears or detachment. A written consent is on file, and the need for today's injection was discussed and the patient is understanding and wishes to proceed. A 30G needle was placed on an Eylea 2mg/0.05ml Pre-filled Syringe. Betadine prep was performed. Topical anesthesia was induced with Alcaine. 4% lidocaine pledge. A lid speculum was used. An intravitreal injection of Eylea was given. Injection site: 3-4 mm from the limbus. The used syringe/needle was transferred to a biohazard container. Lid speculum removed. Mask worn during procedure. Patient tolerated procedure well. Count fingers vision was verified. There were no complications. Patient was given the standard instruction sheet. Kike Forrester

## 2022-08-23 NOTE — PROCEDURE NOTE: CLINICAL

## 2022-08-23 NOTE — PATIENT DISCUSSION
We reviewed the pathophysiology of posterior vitreous detachment and the occasional association with retinal tear and retinal detachment.  No new RT/RD seen ou.

## 2022-08-23 NOTE — PATIENT DISCUSSION
Previously controlled around 6-7 weeks. Maintain injection interval at 8 weeks for improved control.

## 2022-11-21 NOTE — PATIENT DISCUSSION
Single lumen 18G x 8CM midline placed left basilic vein. Max dwell date 12/20/22, Lot# [id number].  Needle advanced into the vessel under real time ultrasound guidance.  Image recorded and saved.    Symptoms of retinal detachment and endophthalmitis following intravitreal injection discussed; patient advised to call immediately if symptoms ensue.

## 2022-12-14 ENCOUNTER — EMERGENCY VISIT (OUTPATIENT)
Dept: URBAN - METROPOLITAN AREA CLINIC 47 | Facility: CLINIC | Age: 67
End: 2022-12-14

## 2022-12-14 PROCEDURE — 99213 OFFICE O/P EST LOW 20 MIN: CPT

## 2022-12-14 ASSESSMENT — TONOMETRY
OD_IOP_MMHG: 14
OS_IOP_MMHG: 14

## 2022-12-14 ASSESSMENT — VISUAL ACUITY
OD_SC: 20/25-1
OS_SC: 20/30+2

## 2023-01-19 ENCOUNTER — FOLLOW UP (OUTPATIENT)
Dept: URBAN - METROPOLITAN AREA CLINIC 47 | Facility: CLINIC | Age: 68
End: 2023-01-19

## 2023-01-19 DIAGNOSIS — H43.392: ICD-10-CM

## 2023-01-19 DIAGNOSIS — H43.811: ICD-10-CM

## 2023-01-19 PROCEDURE — 99213 OFFICE O/P EST LOW 20 MIN: CPT

## 2023-01-19 ASSESSMENT — TONOMETRY
OD_IOP_MMHG: 16
OS_IOP_MMHG: 12

## 2023-01-19 ASSESSMENT — VISUAL ACUITY
OS_SC: 20/30-1
OD_SC: 20/20-2
OU_SC: 20/20-2

## 2023-09-05 NOTE — PATIENT DISCUSSION
Advised to call immediately if any worsening distortion or blurring is noted. Central Line Insertion    Date/Time: 9/5/2023 1:04 AM    Performed by: Yany West DO  Authorized by: Yany West DO    Patient location:  ICU  Other Assisting Provider: Yes (comment) Chelsea Moyer    Consent:     Consent obtained:  Verbal and written    Consent given by:  Patient    Risks discussed:  Arterial puncture, incorrect placement, nerve damage, pneumothorax and infection    Alternatives discussed:  No treatment and alternative treatment  Universal protocol:     Patient identity confirmed:  Verbally with patient and hospital-assigned identification number  Pre-procedure details:     Hand hygiene: Hand hygiene performed prior to insertion      Sterile barrier technique: All elements of maximal sterile technique followed      Skin preparation:  ChloraPrep    Skin preparation agent: Skin preparation agent completely dried prior to procedure    Indications:     Central line indications: medications requiring central line and no peripheral vascular access    Anesthesia (see MAR for exact dosages): Anesthesia method:  Local infiltration    Local anesthetic:  Lidocaine 1% w/o epi  Procedure details:     Location:  Left femoral (Originally attempt R IJ)    Vessel type: vein      Laterality:  Right    Approach: percutaneous technique used      Patient position:  Flat    Catheter type:  Triple lumen 20cm    Landmarks identified: yes      Ultrasound guidance: yes      Ultrasound image availability:  Not saved    Sterile ultrasound techniques: Sterile gel and sterile probe covers were used      Number of attempts:  1    Successful placement: yes    Post-procedure details:     Post-procedure:  Dressing applied and line sutured    Assessment:  Blood return through all ports    Patient tolerance of procedure: Tolerated well, no immediate complications  Comments:      2 attempts were made at [facility] placement. However, patient was not compliant with positioning despite 4mg of Versed. Additionally the IJ was extremely small and compressible.  Given the anatomy and difficulty with compliance the decision was made to abort and proceed with a femoral CVC

## 2024-01-23 ENCOUNTER — COMPREHENSIVE EXAM (OUTPATIENT)
Dept: URBAN - METROPOLITAN AREA CLINIC 47 | Facility: CLINIC | Age: 69
End: 2024-01-23

## 2024-01-23 DIAGNOSIS — H01.025: ICD-10-CM

## 2024-01-23 DIAGNOSIS — H01.022: ICD-10-CM

## 2024-01-23 DIAGNOSIS — H43.392: ICD-10-CM

## 2024-01-23 DIAGNOSIS — H52.03: ICD-10-CM

## 2024-01-23 PROCEDURE — 92015 DETERMINE REFRACTIVE STATE: CPT

## 2024-01-23 PROCEDURE — 92014 COMPRE OPH EXAM EST PT 1/>: CPT

## 2024-01-23 ASSESSMENT — VISUAL ACUITY
OD_SC: 20/25-1
OS_SC: 20/30

## 2024-01-23 ASSESSMENT — TONOMETRY
OS_IOP_MMHG: 16
OD_IOP_MMHG: 14

## 2025-01-27 ENCOUNTER — COMPREHENSIVE EXAM (OUTPATIENT)
Age: 70
End: 2025-01-27

## 2025-01-27 DIAGNOSIS — H52.03: ICD-10-CM

## 2025-01-27 PROCEDURE — 92015 DETERMINE REFRACTIVE STATE: CPT

## 2025-01-27 PROCEDURE — 92014 COMPRE OPH EXAM EST PT 1/>: CPT
